# Patient Record
Sex: FEMALE | Race: WHITE | NOT HISPANIC OR LATINO
[De-identification: names, ages, dates, MRNs, and addresses within clinical notes are randomized per-mention and may not be internally consistent; named-entity substitution may affect disease eponyms.]

---

## 2021-04-15 PROBLEM — Z00.00 ENCOUNTER FOR PREVENTIVE HEALTH EXAMINATION: Status: ACTIVE | Noted: 2021-04-15

## 2021-04-20 ENCOUNTER — APPOINTMENT (OUTPATIENT)
Dept: CARDIOLOGY | Facility: CLINIC | Age: 76
End: 2021-04-20
Payer: MEDICARE

## 2021-04-20 VITALS
DIASTOLIC BLOOD PRESSURE: 80 MMHG | TEMPERATURE: 97.6 F | RESPIRATION RATE: 18 BRPM | HEIGHT: 59 IN | HEART RATE: 104 BPM | OXYGEN SATURATION: 97 % | WEIGHT: 111 LBS | SYSTOLIC BLOOD PRESSURE: 130 MMHG | BODY MASS INDEX: 22.38 KG/M2

## 2021-04-20 DIAGNOSIS — Z82.49 FAMILY HISTORY OF ISCHEMIC HEART DISEASE AND OTHER DISEASES OF THE CIRCULATORY SYSTEM: ICD-10-CM

## 2021-04-20 DIAGNOSIS — N63.0 UNSPECIFIED LUMP IN UNSPECIFIED BREAST: ICD-10-CM

## 2021-04-20 DIAGNOSIS — Z85.850 PERSONAL HISTORY OF MALIGNANT NEOPLASM OF THYROID: ICD-10-CM

## 2021-04-20 DIAGNOSIS — Z78.9 OTHER SPECIFIED HEALTH STATUS: ICD-10-CM

## 2021-04-20 PROCEDURE — 99204 OFFICE O/P NEW MOD 45 MIN: CPT

## 2021-04-20 PROCEDURE — 99072 ADDL SUPL MATRL&STAF TM PHE: CPT

## 2021-04-20 PROCEDURE — 99214 OFFICE O/P EST MOD 30 MIN: CPT

## 2021-04-20 PROCEDURE — 93000 ELECTROCARDIOGRAM COMPLETE: CPT

## 2021-04-20 RX ORDER — SIMVASTATIN 20 MG/1
20 TABLET, FILM COATED ORAL
Refills: 0 | Status: ACTIVE | COMMUNITY

## 2021-04-20 RX ORDER — LOSARTAN POTASSIUM 50 MG/1
50 TABLET, FILM COATED ORAL
Refills: 0 | Status: ACTIVE | COMMUNITY

## 2021-04-20 NOTE — PHYSICAL EXAM
[Normal Oral Mucosa] : normal oral mucosa [No Oral Pallor] : no oral pallor [No Oral Cyanosis] : no oral cyanosis [Normal Jugular Venous A Waves Present] : normal jugular venous A waves present [Normal Jugular Venous V Waves Present] : normal jugular venous V waves present [No Jugular Venous Albert A Waves] : no jugular venous albert A waves [Heart Rate And Rhythm] : heart rate and rhythm were normal [Heart Sounds] : normal S1 and S2 [Systolic grade ___/6] : A grade [unfilled]/6 systolic murmur was heard. [Respiration, Rhythm And Depth] : normal respiratory rhythm and effort [Exaggerated Use Of Accessory Muscles For Inspiration] : no accessory muscle use [Auscultation Breath Sounds / Voice Sounds] : lungs were clear to auscultation bilaterally [Abdomen Soft] : soft [Abdomen Tenderness] : non-tender [Nail Clubbing] : no clubbing of the fingernails [Abdomen Mass (___ Cm)] : no abdominal mass palpated [Cyanosis, Localized] : no localized cyanosis [Petechial Hemorrhages (___cm)] : no petechial hemorrhages [] : no ischemic changes [Skin Color & Pigmentation] : normal skin color and pigmentation [Oriented To Time, Place, And Person] : oriented to person, place, and time Statement Selected

## 2021-04-25 NOTE — ASSESSMENT
[FreeTextEntry1] : 76 yo female with pmhx and presentation as above\par post covid vaccination ST\par hx of htn/dyslipidemia\par abn cbc\par 2d echo\par agree with hem eval for abn cbc\par all labs and rad data reviewed\par cont with aggressive risk modif\par diet and act as tolerated\par rtc after echo

## 2021-04-25 NOTE — HISTORY OF PRESENT ILLNESS
[FreeTextEntry1] : 74 yo female with pmhx as below is here for an initial eval\par was referred by pmd for persistent tachycardiac and murmur eval\par recent covid vaccination\par in the next few weeks persistent ST noted\par lab results revealed abn cbc as well\par has a consult with hem to follow\par no major cvs complains\par et is stable\par no prior card issues\par ros is otherwise as below

## 2021-05-11 ENCOUNTER — APPOINTMENT (OUTPATIENT)
Dept: CARDIOLOGY | Facility: CLINIC | Age: 76
End: 2021-05-11
Payer: MEDICARE

## 2021-05-11 DIAGNOSIS — R79.89 OTHER SPECIFIED ABNORMAL FINDINGS OF BLOOD CHEMISTRY: ICD-10-CM

## 2021-05-11 DIAGNOSIS — R01.1 CARDIAC MURMUR, UNSPECIFIED: ICD-10-CM

## 2021-05-11 PROCEDURE — 93306 TTE W/DOPPLER COMPLETE: CPT

## 2021-05-11 PROCEDURE — 99072 ADDL SUPL MATRL&STAF TM PHE: CPT

## 2021-06-09 ENCOUNTER — NON-APPOINTMENT (OUTPATIENT)
Age: 76
End: 2021-06-09

## 2021-06-09 ENCOUNTER — APPOINTMENT (OUTPATIENT)
Dept: CARDIOTHORACIC SURGERY | Facility: CLINIC | Age: 76
End: 2021-06-09
Payer: MEDICARE

## 2021-06-09 VITALS
HEIGHT: 59 IN | SYSTOLIC BLOOD PRESSURE: 168 MMHG | RESPIRATION RATE: 17 BRPM | HEART RATE: 115 BPM | DIASTOLIC BLOOD PRESSURE: 91 MMHG | BODY MASS INDEX: 22.58 KG/M2 | WEIGHT: 112 LBS | OXYGEN SATURATION: 97 % | TEMPERATURE: 98 F

## 2021-06-09 PROCEDURE — 99213 OFFICE O/P EST LOW 20 MIN: CPT

## 2021-06-09 RX ORDER — DENOSUMAB 60 MG/ML
60 INJECTION SUBCUTANEOUS
Refills: 0 | Status: ACTIVE | COMMUNITY

## 2021-06-09 NOTE — PHYSICAL EXAM
[General Appearance - Alert] : alert [General Appearance - In No Acute Distress] : in no acute distress [General Appearance - Well Nourished] : well nourished [Sclera] : the sclera and conjunctiva were normal [] : no respiratory distress [Respiration, Rhythm And Depth] : normal respiratory rhythm and effort [Exaggerated Use Of Accessory Muscles For Inspiration] : no accessory muscle use [Tachycardia] : tachycardic [Rhythm Regular] : regular [Normal S1] : normal S1 [Normal S2] : normal S2 [II] : a grade 2 [Bowel Sounds] : normal bowel sounds [Abdomen Soft] : soft [Abdomen Tenderness] : non-tender [Abnormal Walk] : normal gait [Nail Clubbing] : no clubbing  or cyanosis of the fingernails [Musculoskeletal - Swelling] : no joint swelling seen [Skin Color & Pigmentation] : normal skin color and pigmentation [Cranial Nerves] : cranial nerves 2-12 were intact [Deep Tendon Reflexes (DTR)] : deep tendon reflexes were 2+ and symmetric [Sensation] : the sensory exam was normal to light touch and pinprick [Oriented To Time, Place, And Person] : oriented to person, place, and time [Impaired Insight] : insight and judgment were intact [Affect] : the affect was normal [Examination Of The Chest] : the chest was normal in appearance [Cervical Lymph Nodes Enlarged Posterior Bilaterally] : posterior cervical

## 2021-06-09 NOTE — HISTORY OF PRESENT ILLNESS
[FreeTextEntry1] : Ms. JOSE BROWN 75 year F arrives  today for evaluation of their Ascending Aortic Aneurysm.  Patient PMH include HTN, DLD, benign breast lump with lumpectomy and thyroid tumor (benign) s/p thyroid lobectomy, Chronic ?leukemia- monitoring. She denies palpitations, chest pain, or back pain.  Has occasional SOB when laying down.  Was sent by her PMD to Dr. Gray for persistent tachycardia and evaluation of a murmur. Pt also referred to Hem/Onc for abnormal CBC. Pt arrives today for review of imaging and discussion of recent CT Results.\par \par Never Smoker\par Lives home with \par Retired \par Recieved COVID Vaccine x2\par Born in [facility] \par \par Their healthcare team includes the following\par PMD: Dr. Palma\par Cardio: Dr. Gray\par Pulmonary:\par Heme/Onc: Dr. Avery ([facility])\par

## 2021-06-09 NOTE — DATA REVIEWED
[FreeTextEntry1] : 	\par EXAM:  CT CHEST WITHOUT CONTRAST\par        \par Note - This patient has received 0 CT studies and 0 Myocardial Perfusion studies within our network over the previous 12 month period.\par \par HISTORY:  Ascending aortic aneurysm. \par \par TECHNIQUE: CT of the chest is performed.\par Contrast Technique: Without \par Range/Planes: [place] of lungs to the adrenal glands. Axial, coronal, and sagittal. \par One or more of the following dose reduction techniques were used: automated exposure control, adjustment of the mA and/or kV according to patient size, use of iterative reconstruction technique. \par \par COMPARISON:  None available.  \par \par FINDINGS: CHEST\par \par THYROID: Visualized portion is unremarkable.\par \par LUNGS: The lungs are clear without active disease or discrete pulmonary nodule. There is no evidence of emphysema or interstitial lung disease.\par \par TRACHEA AND BRONCHI: Unremarkable.\par \par HEART AND PERICARDIUM: Unremarkable.\par \par VASCULATURE: Ascending aortic aneurysm measures up to 4.7 cm. The descending thoracic aorta is normal in caliber. Normal caliber main pulmonary artery.\par \par LYMPH NODES AND MEDIASTINUM: Unremarkable.\par \par SOFT TISSUES: Unremarkable.\par \par BONES: There is a moderate thoracic dextrocurvature. Moderate anterior wedge compression deformity of the L1 vertebral body. Defect along the central vertebral body likely reflects sequela of a Schmorl's node. The bones appear diffusely osteopenic. No suspicious osseous lesion is identified.\par \par FINDINGS: VISUALIZED PORTION OF THE ABDOMEN \par GASTROESOPHAGEAL JUNCTION: Unremarkable.\par OTHER ORGANS: Unremarkable.\par \par IMPRESSION:  \par 1.  4.7 cm aneurysm of the ascending aorta.\par 2.  Moderate anterior wedge compression deformity of the L1 vertebral body.

## 2021-06-09 NOTE — ASSESSMENT
[FreeTextEntry1] : JOSE BROWN is a 75 year F. They were seen in our office for a consultation for an ascending aortic aneurysm. Patient was educated on the presentation of the aneurysm, as well as signs and symptoms of rupture. Patient was instructed to maintain low salt diet, as well as maintain blood pressure parameters less than 130 mm hg systolic as well as a heart rate less than 100 bpm. \par \par Plan:\par CT Chest reviewed with both patient and \par CTA ECG Gated in 6 months\par Echocardiogram at that time as well\par F/U after CTA and Echocardiogram\par F/U with Cardiologist Dr. Gray\par F/U with PMD for routine medical care\par \par I, Ronit Stewart [facility], am acting as scribe for \par \par CTS Attending\par pt interviewed and examined\par CT scan reviewed\par Echo report reviewed\par There is aortic insuffuciency, moderate, LVESD = 2.9 CM\par normal LVEF\par will repeat a 6-month interval CT with contrast as a CTA, and repeat the echo, both at [facility] or \par pt in otherwise good health needs to control her BP\par \par 40 minutes in consultation   \par \par I personally performed the services described in the documentation, reviewed the documentation recorded by the scribe in my presence and it accurately and completely records my words and actions.\par -FMR

## 2021-06-10 ENCOUNTER — TRANSCRIPTION ENCOUNTER (OUTPATIENT)
Age: 76
End: 2021-06-10

## 2021-11-24 ENCOUNTER — NON-APPOINTMENT (OUTPATIENT)
Age: 76
End: 2021-11-24

## 2021-11-27 ENCOUNTER — LABORATORY RESULT (OUTPATIENT)
Age: 76
End: 2021-11-27

## 2021-11-29 ENCOUNTER — FORM ENCOUNTER (OUTPATIENT)
Age: 76
End: 2021-11-29

## 2021-11-30 ENCOUNTER — OUTPATIENT (OUTPATIENT)
Dept: OUTPATIENT SERVICES | Facility: HOSPITAL | Age: 76
LOS: 1 days | Discharge: HOME | End: 2021-11-30
Payer: MEDICARE

## 2021-11-30 ENCOUNTER — RESULT REVIEW (OUTPATIENT)
Age: 76
End: 2021-11-30

## 2021-11-30 DIAGNOSIS — I71.9 AORTIC ANEURYSM OF UNSPECIFIED SITE, WITHOUT RUPTURE: ICD-10-CM

## 2021-11-30 PROCEDURE — 93306 TTE W/DOPPLER COMPLETE: CPT | Mod: 26

## 2021-11-30 PROCEDURE — 71275 CT ANGIOGRAPHY CHEST: CPT | Mod: 26

## 2021-12-15 ENCOUNTER — APPOINTMENT (OUTPATIENT)
Dept: CARDIOTHORACIC SURGERY | Facility: CLINIC | Age: 76
End: 2021-12-15
Payer: MEDICARE

## 2021-12-15 VITALS
HEIGHT: 59 IN | OXYGEN SATURATION: 96 % | WEIGHT: 115 LBS | SYSTOLIC BLOOD PRESSURE: 149 MMHG | DIASTOLIC BLOOD PRESSURE: 78 MMHG | RESPIRATION RATE: 18 BRPM | BODY MASS INDEX: 23.18 KG/M2 | HEART RATE: 99 BPM

## 2021-12-15 PROCEDURE — 99213 OFFICE O/P EST LOW 20 MIN: CPT

## 2021-12-16 NOTE — ASSESSMENT
[FreeTextEntry1] : JOSE BROWN is a 76 year F, never smoker, arrives today for  a follow up  for an ascending aortic aneurysm. Given the absence of a history of sudden death, collagen vascular conditions, and presence of tricuspid aortic valve on echo, patient's threshold for an intervention for the ascending aortic aneurysm would be 5.5 cm or larger or rapid enlargement of the aneurysm (0.6cm/6months). Patient was educated on the presentation of the aneurysm, as well as signs and symptoms of rupture. Medical therapy for thoracic aneurysms was discussed with the patient (BP and HR control). Patient was instructed to maintain low salt diet, as well as maintain blood pressure parameters less than 120 mm hg systolic as well as a heart rate closer to 60 bpm. Patient plan will be to repeat a cat scan without contrast with echo within 1 year.\par \par Reports BP and HR are well controlled \par CTA reviewed with pt and wife\par Prior CT scan non con at [facility] measuring 4.7cm - CTA imaging reviewed, 5cm,  - similar to prior on remeasure by Dr. Monzon  \par \par Continue f/u with PMD and cardio for continued medical management\par F/U CTS in 1 year with repeat CT chest non con with echo \par \par Courtney SALMON [facility], am acting as scribe for Dr. Monzon \par \par CTS Attending\par ct scans reviewed\par aorta is unchanged from prior outside scan nott reviewed by radiologist\par will follow with ct and echo in one year\par I personally performed the services described in the documentation, reviewed the documentation recorded by the scribe in my presence and it accurately and completely records my words and actions.\par -fmr\par \par \par

## 2021-12-16 NOTE — DATA REVIEWED
[FreeTextEntry1] : EXAM: CT ANGIO CHEST AORTA [facility]\par PROCEDURE DATE: 11/30/2021\par INTERPRETATION: Clinical History / Reason for exam: Thoracic aortic aneurysm.\par \par CT angiography was performed from the apices down to the lung bases after the rapid bolus administration of intravenous contrast. Coronal and sagittal rendered images were obtained. 3-D MIP imaging was obtained. 110 cc of low osmolar nonionic contrast was utilized. Intravenous access was obtained on site.\par \par There are no prior films comparison.\par \par The heart size is normal. The aortic root measures 1.1 cm. At the level of the sinus of Valsalva, the aorta measures 4 cm. Immediately after the sinus, the ascending aorta measures 5 cm x 4 cm and then at the level of the main pulmonary artery, 4.7 cm x 4.2 cm. At the level of the brachiocephalic artery, it measures 4 cm x 3.2 cm. At the aortic apex that measures 2.6 cm x 2.4 cm. The proximal descending thoracic aorta measures 2.6 cm x 2.5 cm.\par \par The tracheobronchial tree is patent.\par \par There are no acute opacifications, effusions, or suspicious-appearing nodules.\par \par There is no axillary, hilar, or mediastinal lymphadenopathy. Benign appearing axillary lymph nodes are seen.\par \par Limited evaluation the upper abdomen is within normal limits.\par \par Wedge deformity of the first lumbar vertebral body is seen of unknown age.\par \par IMPRESSION:\par Ascending thoracic aortic aneurysm, 5 cm at its greatest dimension.\par No dissection.\par Wedge deformity of L1.\par \par Echocardiogram 11/30/2021:\par 1. Normal global left ventricular systolic function \par 2. LV Ejection Fraction by العراقي's Method with a biplane EF of 60%\par 3. Normal left ventricular internal cavity size \par 4.  The mean global longitudinal peal strain by speckle tracking is -20.3% which is normal\par 5. Normal left atrial size\par 6. NOrmal right atrial size.\par 7. No evidence of mitral valve regurgitation\par 8. Mild Aortic Regurgitation \par 9. The aortic root and ascending aorta are mildly dilated.  The diaeter at the sinus of Valsalva is 4.0 C.  The Sinotubular junction dimater is 4.1 C.  The ascending aorta diameter is 4.3 CM\par LA Volume Index is 19.6 ml/mi 1/2 ml/m2\par

## 2021-12-16 NOTE — HISTORY OF PRESENT ILLNESS
[FreeTextEntry1] : Ms. JOSE BROWN 76 year F arrives today for follow up of their Ascending Aortic Aneurysm. Patient PMH include HTN, DLD, benign breast lump with lumpectomy and thyroid tumor (benign) s/p thyroid lobectomy, Chronic ?leukemia- monitoring. She denies palpitations, chest pain, or back pain. Has occasional SOB when laying down. Was sent by her PMD to Dr. Gray for persistent tachycardia and evaluation of a murmur. Pt also referred to Hem/Onc for abnormal CBC. Pt arrives today for review of imaging and discussion of recent CT Results.\par \par Never Smoker\par Lives home with \par Retired \par Recieved COVID Vaccine x[facility] \par Sister also has aneurysm \par \par Their healthcare team includes the following\par PMD: Dr. Palma\par Cardio: Dr. Gray\par Pulmonary:\par Heme/Onc: Dr. Avery ([facility])\par

## 2023-01-25 ENCOUNTER — OUTPATIENT (OUTPATIENT)
Dept: OUTPATIENT SERVICES | Facility: HOSPITAL | Age: 78
LOS: 1 days | Discharge: HOME | End: 2023-01-25
Payer: MEDICARE

## 2023-01-25 DIAGNOSIS — I71.9 AORTIC ANEURYSM OF UNSPECIFIED SITE, WITHOUT RUPTURE: ICD-10-CM

## 2023-01-25 DIAGNOSIS — I10 ESSENTIAL (PRIMARY) HYPERTENSION: ICD-10-CM

## 2023-01-25 PROCEDURE — 71250 CT THORAX DX C-: CPT | Mod: 26

## 2023-01-25 PROCEDURE — 93306 TTE W/DOPPLER COMPLETE: CPT | Mod: 26

## 2023-02-08 ENCOUNTER — APPOINTMENT (OUTPATIENT)
Dept: CARDIOTHORACIC SURGERY | Facility: CLINIC | Age: 78
End: 2023-02-08
Payer: MEDICARE

## 2023-02-08 VITALS
TEMPERATURE: 98 F | SYSTOLIC BLOOD PRESSURE: 143 MMHG | HEIGHT: 59 IN | DIASTOLIC BLOOD PRESSURE: 70 MMHG | BODY MASS INDEX: 23.18 KG/M2 | WEIGHT: 115 LBS | HEART RATE: 97 BPM | RESPIRATION RATE: 12 BRPM | OXYGEN SATURATION: 97 %

## 2023-02-08 PROCEDURE — 99212 OFFICE O/P EST SF 10 MIN: CPT

## 2023-02-08 NOTE — HISTORY OF PRESENT ILLNESS
[FreeTextEntry1] : Ms. JOSE BROWN 77 year F arrives today for follow up of their Ascending Aortic Aneurysm. Patient PMH include HTN, DLD, benign breast lump with lumpectomy and thyroid tumor (benign) s/p thyroid lobectomy, Chronic ?leukemia- monitoring. She denies palpitations, chest pain, or back pain. Has occasional SOB when laying down. Was sent by her PMD to Dr. Gray for persistent tachycardia and evaluation of a murmur. Pt also referred to Hem/Onc for abnormal CBC. Pt arrives today for review of imaging and discussion of recent CT Results which showed a stable aneurysm of 4.7CM.\par \par Never Smoker\par Lives home with \par Retired \par Recieved COVID Vaccine x2\par Born in [facility] \par Sister also has aneurysm \par \par Their healthcare team includes the following\par PMD: Dr. Palma\par Cardio: Dr. Gray\par Heme/Onc: Dr. Avery ([facility])\par

## 2023-02-08 NOTE — ASSESSMENT
[FreeTextEntry1] : JOSE BROWN is a 76 year F, never smoker, arrives today for  a follow up  for an ascending aortic aneurysm. Given the absence of a history of sudden death, collagen vascular conditions, and presence of tricuspid aortic valve on echo, patient's threshold for an intervention for the ascending aortic aneurysm would be 5.5 cm or larger or rapid enlargement of the aneurysm (0.6cm/6months). Patient was educated on the presentation of the aneurysm, as well as signs and symptoms of rupture. Medical therapy for thoracic aneurysms was discussed with the patient (BP and HR control). Patient was instructed to maintain low salt diet, as well as maintain blood pressure parameters less than 120 mm hg systolic as well as a heart rate closer to 60 bpm. Patient plan will be to repeat a cat scan without contrast with echo within 1 year.\par \par Plan:\par CT Chest reviewed with patient and \par Stable 4.7-5.0 CM Ascending Aortic Aneurysm\par Echocardiogram showed mild Aortic Regurgitation\par Will plan for CTA Chest Aneurysm Protocol in 1 year\par F/U after for review\par Echocardiogram with Cardiologist at that time as well\par \par I, Ronit Stewart [facility]-BC, am acting as scribe for \par \par I personally performed the services described in the documentation, reviewed the documentation recorded by the scribe in my presence and it accurately and completely records my words and actions.\par -FMR\par

## 2023-12-13 ENCOUNTER — APPOINTMENT (OUTPATIENT)
Dept: OBGYN | Facility: CLINIC | Age: 78
End: 2023-12-13
Payer: MEDICARE

## 2023-12-13 VITALS
BODY MASS INDEX: 23.79 KG/M2 | DIASTOLIC BLOOD PRESSURE: 74 MMHG | WEIGHT: 118 LBS | HEIGHT: 59 IN | SYSTOLIC BLOOD PRESSURE: 142 MMHG

## 2023-12-13 DIAGNOSIS — Z86.79 PERSONAL HISTORY OF OTHER DISEASES OF THE CIRCULATORY SYSTEM: ICD-10-CM

## 2023-12-13 DIAGNOSIS — Z86.69 PERSONAL HISTORY OF OTHER DISEASES OF THE NERVOUS SYSTEM AND SENSE ORGANS: ICD-10-CM

## 2023-12-13 DIAGNOSIS — Z86.39 PERSONAL HISTORY OF OTHER ENDOCRINE, NUTRITIONAL AND METABOLIC DISEASE: ICD-10-CM

## 2023-12-13 DIAGNOSIS — Z87.39 PERSONAL HISTORY OF OTHER DISEASES OF THE MUSCULOSKELETAL SYSTEM AND CONNECTIVE TISSUE: ICD-10-CM

## 2023-12-13 DIAGNOSIS — Z01.419 ENCOUNTER FOR GYNECOLOGICAL EXAMINATION (GENERAL) (ROUTINE) W/OUT ABNORMAL FINDINGS: ICD-10-CM

## 2023-12-13 PROCEDURE — 99387 INIT PM E/M NEW PAT 65+ YRS: CPT

## 2023-12-13 NOTE — HISTORY OF PRESENT ILLNESS
[FreeTextEntry1] : Patient is 78 years old para 2-0-0-2 last menstrual period age 52. She denies postmenopausal bleeding and is presently without complaints.

## 2023-12-13 NOTE — PHYSICAL EXAM
[Chaperone Present] : A chaperone was present in the examining room during all aspects of the physical examination [FreeTextEntry1] : Radha [Appropriately responsive] : appropriately responsive [Alert] : alert [No Acute Distress] : no acute distress [No Lymphadenopathy] : no lymphadenopathy [Regular Rate Rhythm] : regular rate rhythm [No Murmurs] : no murmurs [Clear to Auscultation B/L] : clear to auscultation bilaterally [Soft] : soft [Non-tender] : non-tender [Non-distended] : non-distended [No HSM] : No HSM [No Lesions] : no lesions [No Mass] : no mass [Oriented x3] : oriented x3 [Examination Of The Breasts] : a normal appearance [No Masses] : no breast masses were palpable [Labia Majora] : normal [Labia Minora] : normal [Atrophy] : atrophy [Normal] : normal [Uterine Adnexae] : normal

## 2023-12-13 NOTE — DISCUSSION/SUMMARY
[FreeTextEntry1] : Pap done Self breast exam stressed Prescribed bilateral screening mammogram Recommend OTC Replens vaginal moisturizer two times a week as an alterative to hormonal treatment. Follow-up yearly or as needed

## 2024-05-19 ENCOUNTER — OUTPATIENT (OUTPATIENT)
Dept: OUTPATIENT SERVICES | Facility: HOSPITAL | Age: 79
LOS: 1 days | End: 2024-05-19
Payer: MEDICARE

## 2024-05-19 ENCOUNTER — RESULT REVIEW (OUTPATIENT)
Age: 79
End: 2024-05-19

## 2024-05-19 DIAGNOSIS — Z00.8 ENCOUNTER FOR OTHER GENERAL EXAMINATION: ICD-10-CM

## 2024-05-19 DIAGNOSIS — I71.9 AORTIC ANEURYSM OF UNSPECIFIED SITE, WITHOUT RUPTURE: ICD-10-CM

## 2024-05-19 PROCEDURE — 71275 CT ANGIOGRAPHY CHEST: CPT

## 2024-05-19 PROCEDURE — 71275 CT ANGIOGRAPHY CHEST: CPT | Mod: 26

## 2024-05-20 DIAGNOSIS — I71.9 AORTIC ANEURYSM OF UNSPECIFIED SITE, WITHOUT RUPTURE: ICD-10-CM

## 2024-05-22 ENCOUNTER — APPOINTMENT (OUTPATIENT)
Dept: CARDIOTHORACIC SURGERY | Facility: CLINIC | Age: 79
End: 2024-05-22
Payer: MEDICARE

## 2024-05-22 VITALS
DIASTOLIC BLOOD PRESSURE: 87 MMHG | OXYGEN SATURATION: 97 % | WEIGHT: 118 LBS | TEMPERATURE: 97 F | HEIGHT: 59 IN | RESPIRATION RATE: 11 BRPM | HEART RATE: 120 BPM | SYSTOLIC BLOOD PRESSURE: 142 MMHG | BODY MASS INDEX: 23.79 KG/M2

## 2024-05-22 PROCEDURE — 99214 OFFICE O/P EST MOD 30 MIN: CPT

## 2024-05-22 PROCEDURE — 99204 OFFICE O/P NEW MOD 45 MIN: CPT

## 2024-05-22 NOTE — ASSESSMENT
[FreeTextEntry1] : JOSE BROWN is a 78 year F  being seen in our office for a follow up for an ascending aortic aneurysm. Patient was educated on the presentation of the aneurysm, as well as signs and symptoms of rupture. They were instructed to go to the ER for any chest pain, and back pain. Patient was instructed to maintain low salt diet, as well as maintain blood pressure parameters less than 130 mm hg systolic as well as a heart rate less than 100 bpm.   Plan: CT Imaging reviewed with patient and  Reported 4.6 C Aneurysm, on independent imaging review 4.9CM No reconstruction done in CT Imaging  Will need Echocardiogram now, can get with Dr Gray- Mild AI 1/2023  Will plan for CTA Chest, Abdomen and Pelvis aneurysm protocol in 1 year F/U after for review Continue F/U with Dr Gray regularly F/U with PMD for routine medical care   CTS Attending -------------------- I measured ascending aorta to 4.9 cm at its largest diameter Patient is only 5 ft 11 in Will ask Cardio-0 Dr. Gray to repeat echo and do a comprehensive eval.  60-min consult -FMR

## 2024-05-22 NOTE — HISTORY OF PRESENT ILLNESS
[FreeTextEntry1] : Ms. JOSE BROWN 78 year F arrives today for follow up of their 4.7 CM Ascending Aortic Aneurysm. Patient PMH include HTN, DLD, benign breast lump with lumpectomy and thyroid tumor (benign) s/p thyroid lobectomy, Chronic ?leukemia- monitoring. She denies palpitations, chest pain, or back pain. Has occasional SOB when laying down. Was sent by her PMD to Dr. Gray for persistent tachycardia and evaluation of a murmur. Pt also referred to Hem/Onc for abnormal CBC. Pt arrives today for review of imaging and discussion of recent CT Results which showed a stable aneurysm of 4.7CM.  Never Smoker Lives home with  Retired  Received COVID Vaccine x2 Born in [facility] Sister also has aneurysm  Their healthcare team includes the following PMD: Dr. Palma Cardio: Dr. Gray Heme/Onc: Dr. Bennie LOONEY)

## 2024-06-07 ENCOUNTER — APPOINTMENT (OUTPATIENT)
Dept: CARDIOLOGY | Facility: CLINIC | Age: 79
End: 2024-06-07
Payer: MEDICARE

## 2024-06-07 PROCEDURE — 93306 TTE W/DOPPLER COMPLETE: CPT

## 2024-07-01 ENCOUNTER — NON-APPOINTMENT (OUTPATIENT)
Age: 79
End: 2024-07-01

## 2024-07-02 ENCOUNTER — APPOINTMENT (OUTPATIENT)
Dept: CARDIOLOGY | Facility: CLINIC | Age: 79
End: 2024-07-02
Payer: MEDICARE

## 2024-07-02 ENCOUNTER — NON-APPOINTMENT (OUTPATIENT)
Age: 79
End: 2024-07-02

## 2024-07-02 VITALS
WEIGHT: 110 LBS | SYSTOLIC BLOOD PRESSURE: 120 MMHG | HEIGHT: 59 IN | DIASTOLIC BLOOD PRESSURE: 70 MMHG | TEMPERATURE: 97 F | RESPIRATION RATE: 14 BRPM | BODY MASS INDEX: 22.18 KG/M2 | HEART RATE: 80 BPM | OXYGEN SATURATION: 98 %

## 2024-07-02 DIAGNOSIS — I71.9 AORTIC ANEURYSM OF UNSPECIFIED SITE, W/OUT RUPTURE: ICD-10-CM

## 2024-07-02 DIAGNOSIS — E78.5 HYPERLIPIDEMIA, UNSPECIFIED: ICD-10-CM

## 2024-07-02 DIAGNOSIS — I08.0 RHEUMATIC DISORDERS OF BOTH MITRAL AND AORTIC VALVES: ICD-10-CM

## 2024-07-02 DIAGNOSIS — I10 ESSENTIAL (PRIMARY) HYPERTENSION: ICD-10-CM

## 2024-07-02 PROCEDURE — 99214 OFFICE O/P EST MOD 30 MIN: CPT | Mod: 25

## 2024-07-02 PROCEDURE — 93000 ELECTROCARDIOGRAM COMPLETE: CPT

## 2024-07-02 PROCEDURE — 99204 OFFICE O/P NEW MOD 45 MIN: CPT | Mod: 25

## 2025-02-12 ENCOUNTER — APPOINTMENT (OUTPATIENT)
Dept: OBGYN | Facility: CLINIC | Age: 80
End: 2025-02-12
Payer: MEDICARE

## 2025-02-12 VITALS
BODY MASS INDEX: 20.96 KG/M2 | SYSTOLIC BLOOD PRESSURE: 120 MMHG | WEIGHT: 104 LBS | DIASTOLIC BLOOD PRESSURE: 78 MMHG | HEIGHT: 59 IN

## 2025-02-12 DIAGNOSIS — Z01.419 ENCOUNTER FOR GYNECOLOGICAL EXAMINATION (GENERAL) (ROUTINE) W/OUT ABNORMAL FINDINGS: ICD-10-CM

## 2025-02-12 PROCEDURE — 99459 PELVIC EXAMINATION: CPT

## 2025-02-12 PROCEDURE — 99397 PER PM REEVAL EST PAT 65+ YR: CPT | Mod: 25

## 2025-06-07 ENCOUNTER — RESULT REVIEW (OUTPATIENT)
Age: 80
End: 2025-06-07

## 2025-06-07 ENCOUNTER — OUTPATIENT (OUTPATIENT)
Dept: OUTPATIENT SERVICES | Facility: HOSPITAL | Age: 80
LOS: 1 days | End: 2025-06-07
Payer: MEDICARE

## 2025-06-07 DIAGNOSIS — I71.9 AORTIC ANEURYSM OF UNSPECIFIED SITE, WITHOUT RUPTURE: ICD-10-CM

## 2025-06-07 PROCEDURE — 71275 CT ANGIOGRAPHY CHEST: CPT

## 2025-06-07 PROCEDURE — 74174 CTA ABD&PLVS W/CONTRAST: CPT

## 2025-06-07 PROCEDURE — 71275 CT ANGIOGRAPHY CHEST: CPT | Mod: 26

## 2025-06-07 PROCEDURE — 74174 CTA ABD&PLVS W/CONTRAST: CPT | Mod: 26

## 2025-06-08 DIAGNOSIS — I71.9 AORTIC ANEURYSM OF UNSPECIFIED SITE, WITHOUT RUPTURE: ICD-10-CM

## 2025-06-12 ENCOUNTER — APPOINTMENT (OUTPATIENT)
Dept: CARDIOTHORACIC SURGERY | Facility: CLINIC | Age: 80
End: 2025-06-12

## 2025-06-12 VITALS
BODY MASS INDEX: 21.57 KG/M2 | SYSTOLIC BLOOD PRESSURE: 121 MMHG | HEIGHT: 59 IN | HEART RATE: 92 BPM | TEMPERATURE: 98.1 F | RESPIRATION RATE: 16 BRPM | DIASTOLIC BLOOD PRESSURE: 79 MMHG | OXYGEN SATURATION: 96 % | WEIGHT: 107 LBS

## 2025-06-12 PROCEDURE — 99214 OFFICE O/P EST MOD 30 MIN: CPT

## 2025-06-17 ENCOUNTER — APPOINTMENT (OUTPATIENT)
Dept: CARDIOLOGY | Facility: CLINIC | Age: 80
End: 2025-06-17
Payer: MEDICARE

## 2025-06-17 VITALS
BODY MASS INDEX: 21.97 KG/M2 | OXYGEN SATURATION: 97 % | RESPIRATION RATE: 16 BRPM | HEART RATE: 83 BPM | DIASTOLIC BLOOD PRESSURE: 80 MMHG | WEIGHT: 109 LBS | SYSTOLIC BLOOD PRESSURE: 140 MMHG | HEIGHT: 59 IN

## 2025-06-17 PROCEDURE — 93000 ELECTROCARDIOGRAM COMPLETE: CPT

## 2025-06-17 PROCEDURE — 99214 OFFICE O/P EST MOD 30 MIN: CPT | Mod: 25
